# Patient Record
Sex: FEMALE | Race: WHITE | NOT HISPANIC OR LATINO | URBAN - METROPOLITAN AREA
[De-identification: names, ages, dates, MRNs, and addresses within clinical notes are randomized per-mention and may not be internally consistent; named-entity substitution may affect disease eponyms.]

---

## 2020-10-19 ENCOUNTER — OFFICE VISIT (OUTPATIENT)
Dept: URBAN - METROPOLITAN AREA CLINIC 19 | Facility: CLINIC | Age: 25
End: 2020-10-19
Payer: COMMERCIAL

## 2020-10-19 DIAGNOSIS — R11.14 BILIOUS VOMITING WITH NAUSEA: ICD-10-CM

## 2020-10-19 DIAGNOSIS — Z83.71 FAMILY HISTORY OF COLONIC POLYPS: ICD-10-CM

## 2020-10-19 DIAGNOSIS — R10.84 GENERALIZED ABDOMINAL PAIN: ICD-10-CM

## 2020-10-19 DIAGNOSIS — R19.4 CHANGE IN BOWEL HABITS: ICD-10-CM

## 2020-10-19 PROCEDURE — 99244 OFF/OP CNSLTJ NEW/EST MOD 40: CPT | Performed by: INTERNAL MEDICINE

## 2020-10-19 PROCEDURE — 99254 IP/OBS CNSLTJ NEW/EST MOD 60: CPT | Performed by: INTERNAL MEDICINE

## 2020-10-19 NOTE — HPI-TODAY'S VISIT:
Mrs. Finn is a 25 year old female who was referred by Dr. Ernestina Lux for diarrhea/constipation, abdominal pain,  and change in bowel habits.   Mrs. Finn reports having "stomach problems" her whole life. She reports her grandmother was diagnosed with ulcerative colitis. She reports her mother also had issues for which she takes medication but does not recall diagnosis. She reports her father had colon polyps at young age but does not recall age.   She reports around beginning of August 2020. She reports having abdominal pain. She reports the pain was generalized and characterized as tight twisting and occassionally sharp stabbing. She reports the pain lasted approximately 1-1.5 months. She reports around this time having 3-4 BMs/day; she reports her baseline is a BM every 2 days.    She reports around mid September 2020 her bowel habits changed to constipation. She estimates having a BM every 5-7 days during this time period.   She reports taking miralax and water to help with regularity.   She reports not trying fiber supplements and reports her diet is typically not high in dietary fiber.   She reports having change in stool color. She reports having light tan and green stool.

## 2020-11-03 ENCOUNTER — OFFICE VISIT (OUTPATIENT)
Dept: URBAN - METROPOLITAN AREA LAB 2 | Facility: LAB | Age: 25
End: 2020-11-03

## 2020-11-10 ENCOUNTER — OFFICE VISIT (OUTPATIENT)
Dept: URBAN - METROPOLITAN AREA MEDICAL CENTER 25 | Facility: MEDICAL CENTER | Age: 25
End: 2020-11-10